# Patient Record
Sex: FEMALE | Race: WHITE | Employment: UNEMPLOYED | URBAN - METROPOLITAN AREA
[De-identification: names, ages, dates, MRNs, and addresses within clinical notes are randomized per-mention and may not be internally consistent; named-entity substitution may affect disease eponyms.]

---

## 2022-03-19 PROBLEM — R31.29 MICROHEMATURIA: Status: ACTIVE | Noted: 2018-12-13

## 2023-04-10 ENCOUNTER — OFFICE VISIT (OUTPATIENT)
Age: 53
End: 2023-04-10
Payer: OTHER GOVERNMENT

## 2023-04-10 VITALS
BODY MASS INDEX: 23.52 KG/M2 | WEIGHT: 127.8 LBS | RESPIRATION RATE: 16 BRPM | HEIGHT: 62 IN | SYSTOLIC BLOOD PRESSURE: 125 MMHG | HEART RATE: 92 BPM | OXYGEN SATURATION: 98 % | DIASTOLIC BLOOD PRESSURE: 85 MMHG

## 2023-04-10 DIAGNOSIS — K76.0 FATTY LIVER: ICD-10-CM

## 2023-04-10 DIAGNOSIS — R93.2 ABNORMAL LIVER ULTRASOUND: Primary | ICD-10-CM

## 2023-04-10 PROCEDURE — 3074F SYST BP LT 130 MM HG: CPT | Performed by: INTERNAL MEDICINE

## 2023-04-10 PROCEDURE — 91200 LIVER ELASTOGRAPHY: CPT | Performed by: INTERNAL MEDICINE

## 2023-04-10 PROCEDURE — 99204 OFFICE O/P NEW MOD 45 MIN: CPT | Performed by: INTERNAL MEDICINE

## 2023-04-10 PROCEDURE — 3078F DIAST BP <80 MM HG: CPT | Performed by: INTERNAL MEDICINE

## 2023-04-10 RX ORDER — ATORVASTATIN CALCIUM 10 MG/1
10 TABLET, FILM COATED ORAL DAILY
COMMUNITY

## 2023-04-10 RX ORDER — AMLODIPINE BESYLATE 5 MG/1
5 TABLET ORAL DAILY
COMMUNITY

## 2023-04-10 RX ORDER — SERTRALINE HYDROCHLORIDE 20 MG/ML
25 SOLUTION ORAL DAILY
COMMUNITY

## 2023-04-10 RX ORDER — URSODIOL 300 MG/1
300 CAPSULE ORAL
Qty: 180 CAPSULE | Refills: 3 | Status: SHIPPED | OUTPATIENT
Start: 2023-04-10 | End: 2024-04-09

## 2023-10-09 PROBLEM — N83.209 OVARIAN CYST: Status: ACTIVE | Noted: 2019-03-29

## 2023-10-09 PROBLEM — K92.2 GI BLEED: Status: ACTIVE | Noted: 2017-05-10

## 2023-10-09 PROBLEM — D64.9 ANEMIA: Status: ACTIVE | Noted: 2017-03-28

## 2023-10-09 PROBLEM — N92.0 MENORRHAGIA: Status: ACTIVE | Noted: 2023-10-09

## 2023-10-09 PROBLEM — O24.419 GESTATIONAL DIABETES: Status: ACTIVE | Noted: 2019-03-29

## 2023-10-09 PROBLEM — M54.16 LUMBAR RADICULOPATHY: Status: ACTIVE | Noted: 2021-09-15

## 2023-10-09 PROBLEM — K42.9 UMBILICAL HERNIA: Status: ACTIVE | Noted: 2019-03-29

## 2023-10-09 PROBLEM — R25.2 CRAMP OF BOTH LOWER EXTREMITIES: Status: ACTIVE | Noted: 2023-10-09

## 2023-10-09 PROBLEM — E78.5 DYSLIPIDEMIA: Status: ACTIVE | Noted: 2019-03-29

## 2023-10-09 PROBLEM — D25.9 UTERINE LEIOMYOMA: Status: ACTIVE | Noted: 2020-04-28

## 2023-10-09 PROBLEM — G43.909 HEADACHE, MIGRAINE: Status: ACTIVE | Noted: 2019-03-29

## 2023-10-09 PROBLEM — K21.9 GASTROESOPHAGEAL REFLUX DISEASE: Status: ACTIVE | Noted: 2023-10-09

## 2023-10-09 PROBLEM — M41.9 SCOLIOSIS: Status: ACTIVE | Noted: 2019-03-29

## 2023-10-09 PROBLEM — J45.20 MILD INTERMITTENT ASTHMA WITHOUT COMPLICATION: Status: ACTIVE | Noted: 2019-03-29

## 2023-10-09 PROBLEM — R42 DIZZY SPELLS: Status: ACTIVE | Noted: 2019-04-05

## 2023-10-09 PROBLEM — D50.9 IRON DEFICIENCY ANEMIA: Status: ACTIVE | Noted: 2023-10-09

## 2023-10-09 PROBLEM — K63.5 COLON POLYP: Status: ACTIVE | Noted: 2017-10-17

## 2023-10-09 PROBLEM — K80.20 CHOLELITHIASIS: Status: ACTIVE | Noted: 2019-03-29

## 2023-10-09 PROBLEM — E61.1 IRON DEFICIENCY: Status: ACTIVE | Noted: 2021-08-12

## 2023-10-09 PROBLEM — R73.01 IMPAIRED FASTING GLUCOSE: Status: ACTIVE | Noted: 2019-03-29

## 2023-10-09 PROBLEM — R29.898 WEAKNESS OF LEFT FOOT: Status: ACTIVE | Noted: 2021-10-20

## 2023-10-09 PROBLEM — R55 SYNCOPE AND COLLAPSE: Status: ACTIVE | Noted: 2019-03-29

## 2023-10-09 PROBLEM — F32.A DEPRESSION: Status: ACTIVE | Noted: 2019-03-29

## 2024-01-19 ENCOUNTER — TELEPHONE (OUTPATIENT)
Age: 54
End: 2024-01-19

## 2024-01-19 NOTE — TELEPHONE ENCOUNTER
1/19/2024    Juan Luis asking for call back to re-schedule.  Cancelled 1/22/2024 appt per message.    efs        ----- Message from Toyin Landaverde sent at 1/19/2024 12:35 PM EST -----  Regarding: Cancellation of appointment   Contact: [phone]  Hi. I need to change my appointment to a different day. I’m scheduled Monday Jan 22 at 3:30.  I didn’t realize you were closed on Fridays.  Please call me back so I can reschedule.  Thank you.  Toyin Landaverde

## 2024-02-28 ENCOUNTER — HOSPITAL ENCOUNTER (OUTPATIENT)
Facility: HOSPITAL | Age: 54
Discharge: HOME | End: 2024-03-02
Payer: OTHER GOVERNMENT

## 2024-02-28 ENCOUNTER — TRANSCRIBE ORDERS (OUTPATIENT)
Facility: HOSPITAL | Age: 54
End: 2024-02-28

## 2024-02-28 VITALS — HEIGHT: 62 IN | WEIGHT: 126 LBS | BODY MASS INDEX: 23.19 KG/M2

## 2024-02-28 DIAGNOSIS — D25.9 UTERINE LEIOMYOMA, UNSPECIFIED LOCATION: ICD-10-CM

## 2024-02-28 DIAGNOSIS — N39.3 FEMALE STRESS INCONTINENCE: ICD-10-CM

## 2024-02-28 DIAGNOSIS — R10.2 PELVIC PAIN SYNDROME: ICD-10-CM

## 2024-02-28 DIAGNOSIS — N84.0 POLYP OF CORPUS UTERI: ICD-10-CM

## 2024-02-28 DIAGNOSIS — N88.2 STRICTURE AND STENOSIS OF CERVIX: Primary | ICD-10-CM

## 2024-02-28 DIAGNOSIS — N88.2 STRICTURE AND STENOSIS OF CERVIX: ICD-10-CM

## 2024-02-28 LAB
ANION GAP SERPL CALC-SCNC: 4 MMOL/L (ref 3–18)
BASOPHILS # BLD: 0 K/UL (ref 0–0.1)
BASOPHILS NFR BLD: 0 % (ref 0–2)
BUN SERPL-MCNC: 15 MG/DL (ref 7–18)
BUN/CREAT SERPL: 23 (ref 12–20)
CALCIUM SERPL-MCNC: 9.1 MG/DL (ref 8.5–10.1)
CHLORIDE SERPL-SCNC: 103 MMOL/L (ref 100–111)
CO2 SERPL-SCNC: 30 MMOL/L (ref 21–32)
CREAT SERPL-MCNC: 0.66 MG/DL (ref 0.6–1.3)
DIFFERENTIAL METHOD BLD: ABNORMAL
EKG ATRIAL RATE: 87 BPM
EKG DIAGNOSIS: NORMAL
EKG P AXIS: 68 DEGREES
EKG P-R INTERVAL: 136 MS
EKG Q-T INTERVAL: 360 MS
EKG QRS DURATION: 86 MS
EKG QTC CALCULATION (BAZETT): 433 MS
EKG R AXIS: 41 DEGREES
EKG T AXIS: 74 DEGREES
EKG VENTRICULAR RATE: 87 BPM
EOSINOPHIL # BLD: 0.1 K/UL (ref 0–0.4)
EOSINOPHIL NFR BLD: 1 % (ref 0–5)
ERYTHROCYTE [DISTWIDTH] IN BLOOD BY AUTOMATED COUNT: 18.4 % (ref 11.6–14.5)
EST. AVERAGE GLUCOSE BLD GHB EST-MCNC: 100 MG/DL
GLUCOSE SERPL-MCNC: 92 MG/DL (ref 74–99)
HBA1C MFR BLD: 5.1 % (ref 4.2–5.6)
HCT VFR BLD AUTO: 33.5 % (ref 35–45)
HGB BLD-MCNC: 10.4 G/DL (ref 12–16)
IMM GRANULOCYTES # BLD AUTO: 0.1 K/UL (ref 0–0.04)
IMM GRANULOCYTES NFR BLD AUTO: 1 % (ref 0–0.5)
LYMPHOCYTES # BLD: 1.4 K/UL (ref 0.9–3.6)
LYMPHOCYTES NFR BLD: 16 % (ref 21–52)
MCH RBC QN AUTO: 25.4 PG (ref 24–34)
MCHC RBC AUTO-ENTMCNC: 31 G/DL (ref 31–37)
MCV RBC AUTO: 81.9 FL (ref 78–100)
MONOCYTES # BLD: 0.7 K/UL (ref 0.05–1.2)
MONOCYTES NFR BLD: 7 % (ref 3–10)
NEUTS SEG # BLD: 6.8 K/UL (ref 1.8–8)
NEUTS SEG NFR BLD: 75 % (ref 40–73)
NRBC # BLD: 0 K/UL (ref 0–0.01)
NRBC BLD-RTO: 0 PER 100 WBC
PLATELET # BLD AUTO: 286 K/UL (ref 135–420)
PMV BLD AUTO: 11.3 FL (ref 9.2–11.8)
POTASSIUM SERPL-SCNC: 4 MMOL/L (ref 3.5–5.5)
RBC # BLD AUTO: 4.09 M/UL (ref 4.2–5.3)
SODIUM SERPL-SCNC: 137 MMOL/L (ref 136–145)
WBC # BLD AUTO: 9.1 K/UL (ref 4.6–13.2)

## 2024-02-28 PROCEDURE — 83036 HEMOGLOBIN GLYCOSYLATED A1C: CPT

## 2024-02-28 PROCEDURE — 85025 COMPLETE CBC W/AUTO DIFF WBC: CPT

## 2024-02-28 PROCEDURE — 93005 ELECTROCARDIOGRAM TRACING: CPT

## 2024-02-28 PROCEDURE — 80048 BASIC METABOLIC PNL TOTAL CA: CPT

## 2024-03-01 NOTE — H&P
as needed N      02/20/2024 tranexamic acid 650 mg tablet take 2 tablet by oral route 3 times every day during menses N      07/19/2018 Zoloft 50 mg tablet TAKE 1 TABLET BY MOUTH ONCE A DAY. N          To Be Scheduled / Ordered  Status Order Reason Assessment Timeframe Appointment   ordered HCG In Office Urine  Z32.02         Orders/Procedures/Instructions/Educations  Labs  HCG In Office Urine       Service CPT Mod Dx 1 Dx 2 Dx 3 Dx 4   SYST BP < 130 MM HG 3074F  Z00.00      OFFICE/OUTPATIENT VISIT, EST 35823 25 D25.1 N88.2 D25.9    MED LIST DOCD IN RD 1159F  Z00.00      DIAST BP 80-89 MM HG 3079F  Z00.00      BIOPSY OF UTERUS LINING 46632 52 D25.9        ndc cpt4    1159F    3074F    3079F    43549    32777     Provider  Alex Huggins 02/27/2024 8:44 AM   Document generated by: Alex Huggins 02/27/2024 08:44 AM      ----------------------------------------------------------------------------------------------------------------------------------------------------------------------      Electronically signed by Alex Huggins DO on 02/27/2024 10:39 AM

## 2024-03-05 ENCOUNTER — ANESTHESIA EVENT (OUTPATIENT)
Facility: HOSPITAL | Age: 54
End: 2024-03-05
Payer: OTHER GOVERNMENT

## 2024-03-11 ENCOUNTER — HOSPITAL ENCOUNTER (OUTPATIENT)
Facility: HOSPITAL | Age: 54
Setting detail: OUTPATIENT SURGERY
Discharge: HOME | End: 2024-03-11
Attending: OBSTETRICS & GYNECOLOGY | Admitting: OBSTETRICS & GYNECOLOGY
Payer: OTHER GOVERNMENT

## 2024-03-11 ENCOUNTER — ANESTHESIA (OUTPATIENT)
Facility: HOSPITAL | Age: 54
End: 2024-03-11
Payer: OTHER GOVERNMENT

## 2024-03-11 VITALS
BODY MASS INDEX: 23.55 KG/M2 | WEIGHT: 128 LBS | HEIGHT: 62 IN | SYSTOLIC BLOOD PRESSURE: 126 MMHG | RESPIRATION RATE: 16 BRPM | DIASTOLIC BLOOD PRESSURE: 77 MMHG | OXYGEN SATURATION: 100 % | HEART RATE: 73 BPM | TEMPERATURE: 97.2 F

## 2024-03-11 LAB
GLUCOSE BLD STRIP.AUTO-MCNC: 94 MG/DL (ref 70–110)
HCG UR QL: NEGATIVE

## 2024-03-11 PROCEDURE — 7100000010 HC PHASE II RECOVERY - FIRST 15 MIN: Performed by: OBSTETRICS & GYNECOLOGY

## 2024-03-11 PROCEDURE — 7100000001 HC PACU RECOVERY - ADDTL 15 MIN: Performed by: OBSTETRICS & GYNECOLOGY

## 2024-03-11 PROCEDURE — 6360000002 HC RX W HCPCS: Performed by: NURSE ANESTHETIST, CERTIFIED REGISTERED

## 2024-03-11 PROCEDURE — 3600000012 HC SURGERY LEVEL 2 ADDTL 15MIN: Performed by: OBSTETRICS & GYNECOLOGY

## 2024-03-11 PROCEDURE — 2709999900 HC NON-CHARGEABLE SUPPLY: Performed by: OBSTETRICS & GYNECOLOGY

## 2024-03-11 PROCEDURE — 3600000002 HC SURGERY LEVEL 2 BASE: Performed by: OBSTETRICS & GYNECOLOGY

## 2024-03-11 PROCEDURE — 7100000000 HC PACU RECOVERY - FIRST 15 MIN: Performed by: OBSTETRICS & GYNECOLOGY

## 2024-03-11 PROCEDURE — 7100000011 HC PHASE II RECOVERY - ADDTL 15 MIN: Performed by: OBSTETRICS & GYNECOLOGY

## 2024-03-11 PROCEDURE — 3700000000 HC ANESTHESIA ATTENDED CARE: Performed by: OBSTETRICS & GYNECOLOGY

## 2024-03-11 PROCEDURE — 82962 GLUCOSE BLOOD TEST: CPT

## 2024-03-11 PROCEDURE — 3700000001 HC ADD 15 MINUTES (ANESTHESIA): Performed by: OBSTETRICS & GYNECOLOGY

## 2024-03-11 PROCEDURE — 2500000003 HC RX 250 WO HCPCS: Performed by: NURSE ANESTHETIST, CERTIFIED REGISTERED

## 2024-03-11 PROCEDURE — A4217 STERILE WATER/SALINE, 500 ML: HCPCS | Performed by: OBSTETRICS & GYNECOLOGY

## 2024-03-11 PROCEDURE — 88305 TISSUE EXAM BY PATHOLOGIST: CPT

## 2024-03-11 PROCEDURE — 81025 URINE PREGNANCY TEST: CPT

## 2024-03-11 PROCEDURE — 2580000003 HC RX 258: Performed by: OBSTETRICS & GYNECOLOGY

## 2024-03-11 RX ORDER — HYDROMORPHONE HYDROCHLORIDE 1 MG/ML
0.5 INJECTION, SOLUTION INTRAMUSCULAR; INTRAVENOUS; SUBCUTANEOUS EVERY 5 MIN PRN
Status: DISCONTINUED | OUTPATIENT
Start: 2024-03-11 | End: 2024-03-11 | Stop reason: HOSPADM

## 2024-03-11 RX ORDER — MEPERIDINE HYDROCHLORIDE 50 MG/ML
12.5 INJECTION INTRAMUSCULAR; INTRAVENOUS; SUBCUTANEOUS ONCE
Status: DISCONTINUED | OUTPATIENT
Start: 2024-03-11 | End: 2024-03-11 | Stop reason: HOSPADM

## 2024-03-11 RX ORDER — ONDANSETRON 2 MG/ML
INJECTION INTRAMUSCULAR; INTRAVENOUS PRN
Status: DISCONTINUED | OUTPATIENT
Start: 2024-03-11 | End: 2024-03-11 | Stop reason: SDUPTHER

## 2024-03-11 RX ORDER — PROPOFOL 10 MG/ML
INJECTION, EMULSION INTRAVENOUS PRN
Status: DISCONTINUED | OUTPATIENT
Start: 2024-03-11 | End: 2024-03-11 | Stop reason: SDUPTHER

## 2024-03-11 RX ORDER — FENTANYL CITRATE 50 UG/ML
INJECTION, SOLUTION INTRAMUSCULAR; INTRAVENOUS PRN
Status: DISCONTINUED | OUTPATIENT
Start: 2024-03-11 | End: 2024-03-11 | Stop reason: SDUPTHER

## 2024-03-11 RX ORDER — SODIUM CHLORIDE, SODIUM LACTATE, POTASSIUM CHLORIDE, CALCIUM CHLORIDE 600; 310; 30; 20 MG/100ML; MG/100ML; MG/100ML; MG/100ML
INJECTION, SOLUTION INTRAVENOUS CONTINUOUS
Status: DISCONTINUED | OUTPATIENT
Start: 2024-03-11 | End: 2024-03-11 | Stop reason: HOSPADM

## 2024-03-11 RX ORDER — DIPHENHYDRAMINE HYDROCHLORIDE 50 MG/ML
12.5 INJECTION INTRAMUSCULAR; INTRAVENOUS
Status: DISCONTINUED | OUTPATIENT
Start: 2024-03-11 | End: 2024-03-11 | Stop reason: HOSPADM

## 2024-03-11 RX ORDER — DROPERIDOL 2.5 MG/ML
0.62 INJECTION, SOLUTION INTRAMUSCULAR; INTRAVENOUS
Status: DISCONTINUED | OUTPATIENT
Start: 2024-03-11 | End: 2024-03-11 | Stop reason: HOSPADM

## 2024-03-11 RX ORDER — MIDAZOLAM HYDROCHLORIDE 1 MG/ML
INJECTION INTRAMUSCULAR; INTRAVENOUS PRN
Status: DISCONTINUED | OUTPATIENT
Start: 2024-03-11 | End: 2024-03-11 | Stop reason: SDUPTHER

## 2024-03-11 RX ORDER — IPRATROPIUM BROMIDE AND ALBUTEROL SULFATE 2.5; .5 MG/3ML; MG/3ML
1 SOLUTION RESPIRATORY (INHALATION)
Status: DISCONTINUED | OUTPATIENT
Start: 2024-03-11 | End: 2024-03-11 | Stop reason: HOSPADM

## 2024-03-11 RX ORDER — NALOXONE HYDROCHLORIDE 0.4 MG/ML
INJECTION, SOLUTION INTRAMUSCULAR; INTRAVENOUS; SUBCUTANEOUS PRN
Status: DISCONTINUED | OUTPATIENT
Start: 2024-03-11 | End: 2024-03-11 | Stop reason: HOSPADM

## 2024-03-11 RX ORDER — SODIUM CHLORIDE 9 MG/ML
INJECTION, SOLUTION INTRAVENOUS PRN
Status: DISCONTINUED | OUTPATIENT
Start: 2024-03-11 | End: 2024-03-11 | Stop reason: HOSPADM

## 2024-03-11 RX ORDER — LABETALOL HYDROCHLORIDE 5 MG/ML
10 INJECTION, SOLUTION INTRAVENOUS
Status: DISCONTINUED | OUTPATIENT
Start: 2024-03-11 | End: 2024-03-11 | Stop reason: HOSPADM

## 2024-03-11 RX ORDER — SODIUM CHLORIDE 0.9 % (FLUSH) 0.9 %
5-40 SYRINGE (ML) INJECTION EVERY 12 HOURS SCHEDULED
Status: DISCONTINUED | OUTPATIENT
Start: 2024-03-11 | End: 2024-03-11 | Stop reason: HOSPADM

## 2024-03-11 RX ORDER — OXYCODONE HYDROCHLORIDE 5 MG/1
5 TABLET ORAL
Status: DISCONTINUED | OUTPATIENT
Start: 2024-03-11 | End: 2024-03-11 | Stop reason: HOSPADM

## 2024-03-11 RX ORDER — SODIUM CHLORIDE 0.9 % (FLUSH) 0.9 %
5-40 SYRINGE (ML) INJECTION PRN
Status: DISCONTINUED | OUTPATIENT
Start: 2024-03-11 | End: 2024-03-11 | Stop reason: HOSPADM

## 2024-03-11 RX ORDER — DEXAMETHASONE SODIUM PHOSPHATE 4 MG/ML
INJECTION, SOLUTION INTRA-ARTICULAR; INTRALESIONAL; INTRAMUSCULAR; INTRAVENOUS; SOFT TISSUE PRN
Status: DISCONTINUED | OUTPATIENT
Start: 2024-03-11 | End: 2024-03-11 | Stop reason: SDUPTHER

## 2024-03-11 RX ORDER — LIDOCAINE HYDROCHLORIDE 20 MG/ML
INJECTION, SOLUTION EPIDURAL; INFILTRATION; INTRACAUDAL; PERINEURAL PRN
Status: DISCONTINUED | OUTPATIENT
Start: 2024-03-11 | End: 2024-03-11 | Stop reason: SDUPTHER

## 2024-03-11 RX ORDER — ONDANSETRON 2 MG/ML
4 INJECTION INTRAMUSCULAR; INTRAVENOUS
Status: DISCONTINUED | OUTPATIENT
Start: 2024-03-11 | End: 2024-03-11 | Stop reason: HOSPADM

## 2024-03-11 RX ORDER — FENTANYL CITRATE 50 UG/ML
25 INJECTION, SOLUTION INTRAMUSCULAR; INTRAVENOUS EVERY 5 MIN PRN
Status: DISCONTINUED | OUTPATIENT
Start: 2024-03-11 | End: 2024-03-11 | Stop reason: HOSPADM

## 2024-03-11 RX ORDER — MAGNESIUM HYDROXIDE 1200 MG/15ML
LIQUID ORAL CONTINUOUS PRN
Status: COMPLETED | OUTPATIENT
Start: 2024-03-11 | End: 2024-03-11

## 2024-03-11 RX ADMIN — FENTANYL CITRATE 50 MCG: 50 INJECTION, SOLUTION INTRAMUSCULAR; INTRAVENOUS at 11:10

## 2024-03-11 RX ADMIN — LIDOCAINE HYDROCHLORIDE 80 MG: 20 INJECTION, SOLUTION EPIDURAL; INFILTRATION; INTRACAUDAL; PERINEURAL at 11:13

## 2024-03-11 RX ADMIN — PROPOFOL 150 MG: 10 INJECTION, EMULSION INTRAVENOUS at 11:13

## 2024-03-11 RX ADMIN — SODIUM CHLORIDE, POTASSIUM CHLORIDE, SODIUM LACTATE AND CALCIUM CHLORIDE: 600; 310; 30; 20 INJECTION, SOLUTION INTRAVENOUS at 10:15

## 2024-03-11 RX ADMIN — DEXAMETHASONE SODIUM PHOSPHATE 4 MG: 4 INJECTION, SOLUTION INTRAMUSCULAR; INTRAVENOUS at 11:18

## 2024-03-11 RX ADMIN — ONDANSETRON HYDROCHLORIDE 4 MG: 2 INJECTION INTRAMUSCULAR; INTRAVENOUS at 11:18

## 2024-03-11 RX ADMIN — FENTANYL CITRATE 50 MCG: 50 INJECTION, SOLUTION INTRAMUSCULAR; INTRAVENOUS at 11:13

## 2024-03-11 RX ADMIN — MIDAZOLAM 2 MG: 1 INJECTION INTRAMUSCULAR; INTRAVENOUS at 11:07

## 2024-03-11 ASSESSMENT — PAIN - FUNCTIONAL ASSESSMENT: PAIN_FUNCTIONAL_ASSESSMENT: 0-10

## 2024-03-11 ASSESSMENT — PAIN SCALES - GENERAL
PAINLEVEL_OUTOF10: 0

## 2024-03-11 NOTE — PERIOP NOTE
TRANSFER - OUT REPORT:    Verbal report given to Aure ESPINOZA RN on Toyin Landaverde  being transferred to phase 2 for routine post-op       Report consisted of patient's Situation, Background, Assessment and   Recommendations(SBAR).     Information from the following report(s) Nurse Handoff Report, Adult Overview, Surgery Report, Intake/Output, MAR, Recent Results, and Med Rec Status was reviewed with the receiving nurse.           Lines:   Peripheral IV 03/11/24 Left;Posterior Hand (Active)   Site Assessment Clean, dry & intact 03/11/24 1227   Line Status Infusing 03/11/24 1227   Line Care Connections checked and tightened 03/11/24 1227   Phlebitis Assessment No symptoms 03/11/24 1227   Infiltration Assessment 0 03/11/24 1227   Dressing Status Clean, dry & intact 03/11/24 1227   Dressing Type Transparent 03/11/24 1227   Dressing Intervention New 03/11/24 1102        Opportunity for questions and clarification was provided.      Patient transported with:  Registered Nurse

## 2024-03-11 NOTE — PERIOP NOTE
TRANSFER - IN REPORT:    Verbal report received from NANCY Dietz RN on Toyin Landaverde  being received from PACU for routine post-op      Report consisted of patient's Situation, Background, Assessment and   Recommendations(SBAR).     Information from the following report(s) Nurse Handoff Report, Adult Overview, Surgery Report, Intake/Output, and MAR was reviewed with the receiving nurse.    Opportunity for questions and clarification was provided.      Assessment completed upon patient's arrival to unit and care assumed.

## 2024-03-11 NOTE — DISCHARGE INSTRUCTIONS
Resume pre hospital diet  Drink plenty of fluids  Ambulate in house  Sanitary pads only - no tampons, douching or sex until advised  Shower tomorrow  Follow up with Dr. Huggins as scheduled     Hysteroscopy With Dilation and Curettage: What to Expect at Home  Your Recovery     For a hysteroscopy, your doctor guides a lighted tube through your cervix and into your uterus. This helps the doctor see inside your uterus.  For a dilation and curettage (D&C), your doctor uses a curved tool, called a curette, to gently scrape tissue from your uterus.  After these procedures, you are likely to have a backache or cramps similar to menstrual cramps. Expect to pass small clots of blood from your vagina for the first few days. You may have light vaginal bleeding for several weeks after the D&C.  If the doctor filled your uterus with air, your belly may feel full. You may also have shoulder pain right after the procedure.  You will probably be able to go back to most of your normal activities in 1 or 2 days.  This care sheet gives you a general idea about how long it will take for you to recover. But each person recovers at a different pace. Follow the steps below to get better as quickly as possible.  How can you care for yourself at home?  Activity    Rest when you feel tired.     You will probably be able to return to work the day after the procedure. But it depends on what was done and the type of work you do.     You may have some light vaginal bleeding. Use sanitary pads until you stop bleeding. Using pads makes it easier to monitor your bleeding. Do not rinse your vagina with fluid (douche).     Ask your doctor when it is okay for you to have sex.   Diet    You can eat your normal diet. If your stomach is upset, try bland, low-fat foods like plain rice, broiled chicken, toast, and yogurt.     Drink plenty of fluids (unless your doctor tells you not to).   Medicines    Your doctor will tell you if and when you can restart

## 2024-03-11 NOTE — OP NOTE
Operative Note      Patient: Toyin Landaverde  YOB: 1970  MRN: [id number]    Date of Procedure: 3/11/2024    Pre-Op Diagnosis Codes:     * Uterine leiomyoma, unspecified location [D25.9], Postmenopausal bleeding    Post-Op Diagnosis: Same       Procedure(s):  HYSTEROSCOPY, DILATATION AND CURETTAGE \"SPEC POP\"    Surgeon(s):  Agustina Huggins MD    Assistant:   * No surgical staff found *    Anesthesia: General    Estimated Blood Loss (mL): Minimal    Complications: None    Specimens:   ID Type Source Tests Collected by Time Destination   A : ENDOMETRIAL CURETTINGS Tissue Uterus SURGICAL PATHOLOGY Agustina Huggins MD 3/11/2024 1122        Implants:  * No implants in log *      Drains: * No LDAs found *    Findings: enlarged uterus, grossly normal endometrium        Detailed Description of Procedure:     The patient was taken to the operating room where she was placed in the supine position. After being placed under general anesthesia, she was placed up in the Bebeto laparoscopy stirrups in the dorsal lithotomy position.The patient was then prepped and draped in the usual fashion and the Yoder catheter was placed into the bladder. Attention was first turned to the vagina where the speculum was placed in the vagina. The anterior lip of the cervix was grasped with a single-toothed tenaculum. The cervix was dilated to admit a diagnostic hysteroscope using normal saline for distention media. The above findings were noted. Curettage was performed and specimen was sent to pathology.  All instruments were removed from the vagina. The patient was awakened, extubated and taken to the recovery room in good condition.    Electronically signed by AGUSTINA HUGGINS MD on 3/11/2024 at 11:51 AM

## 2024-03-11 NOTE — PERIOP NOTE
Reviewed PTA medication list with patient/caregiver and patient/caregiver denies any additional medications.     Patient admits to having a responsible adult care for them at home for at least 24 hours after surgery.    Patient encouraged to use gown warming system and informed that using said warming gown to regulate body temperature prior to a procedure has been shown to help reduce the risks of blood clots and infection.    Patient's pharmacy of choice verified and documented in PTA medication section.    Dual skin assessment & fall risk band verification completed with Tanner NEVAREZ RN.

## 2024-03-11 NOTE — ANESTHESIA POSTPROCEDURE EVALUATION
Post-Anesthesia Evaluation and Assessment    Cardiovascular Function/Vital Signs/Pain Score:  Vitals  BP: 125/86  Temp: 98.1 °F (36.7 °C)  Temp Source: Axillary  Pulse: 82  Respirations: 19  SpO2: 100 %  Height: 157.5 cm (5' 2\")  Weight - Scale: 58.1 kg (128 lb)  Pain Level: 0    Patient is status post Procedure(s):  HYSTEROSCOPY, DILATATION AND CURETTAGE \"SPEC POP\".    Nausea/Vomiting: Controlled.    Postoperative hydration reviewed and adequate.    Pain:  Managed    Mental Status and Level of Consciousness: Baseline and appropriate for discharge.    Adequate oxygenation and airway patent.    Complications related to anesthesia: None    Post-anesthesia assessment completed. No concerns.    Patient has met all discharge requirements.    Signed By: Bob Fernández MD    March 11, 2024

## 2024-03-11 NOTE — ANESTHESIA PRE PROCEDURE
Department of Anesthesiology  Preprocedure Note       Name:  Toyin Landaverde   Age:  53 y.o.  :  1970                                          MRN:  [id number]         Date:  3/11/2024      Surgeon: Surgeon(s):  Alex Huggins MD    Procedure: Procedure(s):  HYSTEROSCOPY, DILATATION AND CURETTAGE \"SPEC POP\"    Medications prior to admission:   Prior to Admission medications    Medication Sig Start Date End Date Taking? Authorizing Provider   ferrous sulfate (IRON 325) 325 (65 Fe) MG tablet Take 88 mg by mouth daily (with breakfast)    Bertin Guzman MD   AMLODIPINE-ATORVASTATIN PO Take 5 mg by mouth  Patient not taking: Reported on 3/11/2024    Bertin Guzman MD   pantoprazole (PROTONIX) 20 MG tablet Take 1 tablet by mouth    Bertin Guzman MD   TURMERIC CURCUMIN PO Take 1 capsule by mouth daily    Bertin Guzman MD   Multiple Vitamin (MULTIVITAMIN ADULT PO) Take 1 tablet by mouth daily  Patient not taking: Reported on 3/1/2024    Bertin Guzman MD   OMEGA-3 FATTY ACIDS-VITAMIN E PO Take 1 capsule by mouth    Bertin Guzman MD   Vitamins C E (VITAMIN C/VITAMIN E PO) Take by mouth    Bertin Guzman MD   albuterol sulfate HFA (PROVENTIL;VENTOLIN;PROAIR) 108 (90 Base) MCG/ACT inhaler Inhale 2 puffs into the lungs every 4 hours as needed for Shortness of Breath or Wheezing 23   Bertin Guzman MD   vitamin D (CHOLECALCIFEROL) 25 MCG (1000 UT) TABS tablet Take 5 tablets by mouth    Bertin Guzman MD   cyclobenzaprine (FLEXERIL) 10 MG tablet Take 1 tablet by mouth 3 times daily 21   Bertin Guzman MD   dicyclomine (BENTYL) 20 MG tablet  9/10/22   Bertin Guzman MD   blood glucose test strips (FREESTYLE LITE) strip  21   Bertin Guzman MD   LORazepam (ATIVAN) 0.5 MG tablet Take 1 tablet by mouth 2 times daily as needed for Anxiety. 23   Bertin Guzman MD   montelukast (SINGULAIR) 10 MG tablet Take 1 tablet

## 2024-03-11 NOTE — PERIOP NOTE
TRANSFER - IN REPORT:    Verbal report received from OR nurse and CRNA on Toyin Landaverde  being received from OR for routine post-op      Report consisted of patient's Situation, Background, Assessment and   Recommendations(SBAR).     Information from the following report(s) Nurse Handoff Report, Adult Overview, Surgery Report, Intake/Output, MAR, Recent Results, and Med Rec Status was reviewed with the receiving nurse.    Opportunity for questions and clarification was provided.      Assessment completed upon patient's arrival to unit and care assumed.

## 2024-03-11 NOTE — INTERVAL H&P NOTE
Update History & Physical    The patient's History and Physical was reviewed with the patient and I examined the patient. There was no change. The surgical site was confirmed by the patient and me.       Plan: The risks, benefits, expected outcome, and alternative to the recommended procedure have been discussed with the patient. Patient understands and wants to proceed with the procedure.

## 2024-04-15 ENCOUNTER — OFFICE VISIT (OUTPATIENT)
Age: 54
End: 2024-04-15
Payer: OTHER GOVERNMENT

## 2024-04-15 VITALS
OXYGEN SATURATION: 98 % | HEIGHT: 62 IN | WEIGHT: 127.2 LBS | HEART RATE: 85 BPM | SYSTOLIC BLOOD PRESSURE: 124 MMHG | DIASTOLIC BLOOD PRESSURE: 85 MMHG | BODY MASS INDEX: 23.41 KG/M2 | TEMPERATURE: 98.1 F

## 2024-04-15 DIAGNOSIS — R93.2 ABNORMAL LIVER ULTRASOUND: Primary | ICD-10-CM

## 2024-04-15 PROBLEM — E61.1 IRON DEFICIENCY: Status: RESOLVED | Noted: 2021-08-12 | Resolved: 2024-04-15

## 2024-04-15 PROBLEM — E78.5 DYSLIPIDEMIA: Status: RESOLVED | Noted: 2019-03-29 | Resolved: 2024-04-15

## 2024-04-15 PROBLEM — D64.9 ANEMIA: Status: RESOLVED | Noted: 2017-03-28 | Resolved: 2024-04-15

## 2024-04-15 PROBLEM — R29.898 WEAKNESS OF LEFT FOOT: Status: RESOLVED | Noted: 2021-10-20 | Resolved: 2024-04-15

## 2024-04-15 PROBLEM — R25.2 CRAMP OF BOTH LOWER EXTREMITIES: Status: RESOLVED | Noted: 2023-10-09 | Resolved: 2024-04-15

## 2024-04-15 PROBLEM — R42 DIZZY SPELLS: Status: RESOLVED | Noted: 2019-04-05 | Resolved: 2024-04-15

## 2024-04-15 PROBLEM — F32.A DEPRESSION: Status: RESOLVED | Noted: 2019-03-29 | Resolved: 2024-04-15

## 2024-04-15 PROBLEM — J45.20 MILD INTERMITTENT ASTHMA WITHOUT COMPLICATION: Status: RESOLVED | Noted: 2019-03-29 | Resolved: 2024-04-15

## 2024-04-15 PROBLEM — R55 SYNCOPE AND COLLAPSE: Status: RESOLVED | Noted: 2019-03-29 | Resolved: 2024-04-15

## 2024-04-15 PROBLEM — R31.29 MICROHEMATURIA: Status: RESOLVED | Noted: 2018-12-13 | Resolved: 2024-04-15

## 2024-04-15 PROBLEM — K63.5 COLON POLYP: Status: RESOLVED | Noted: 2017-10-17 | Resolved: 2024-04-15

## 2024-04-15 PROBLEM — K92.2 GI BLEED: Status: RESOLVED | Noted: 2017-05-10 | Resolved: 2024-04-15

## 2024-04-15 PROBLEM — G43.909 HEADACHE, MIGRAINE: Status: RESOLVED | Noted: 2019-03-29 | Resolved: 2024-04-15

## 2024-04-15 PROBLEM — O24.419 GESTATIONAL DIABETES: Status: RESOLVED | Noted: 2019-03-29 | Resolved: 2024-04-15

## 2024-04-15 PROCEDURE — 91200 LIVER ELASTOGRAPHY: CPT | Performed by: INTERNAL MEDICINE

## 2024-04-15 NOTE — PROGRESS NOTES
[facility]      Tommy Son MD, FACP, FACG, FAASLD      KATHARINA Zapata-SILVINO Bliss, [facility]-BC   Salma Multani, [facility]   Chelsea Durant, FNP-C  Nate Mackenzie, FNP-C   Kandi Pollack, [facility]   at [facility]   [address], [address]   [place]  [address] [phone]   FAX: [phone]  [facility]   [address], [address]   [place]  [address] [phone]   FAX: [phone]       Patient Care Team:  Lloyd Lockhart MD as PCP - General (Family Medicine)  Lloyd Lockhart MD (Family Medicine)      Patient Active Problem List   Diagnosis    Hypertension    Asthma    Adenomatous polyp of colon    Anemia    Cholelithiasis    Gastroesophageal reflux disease    Impaired fasting glucose    Iron deficiency anemia    Lumbar radiculopathy    Menorrhagia    Ovarian cyst    Scoliosis    Umbilical hernia    Uterine leiomyoma       Toyin Landaverde is being seen at [facility] for management of Steatotic Liver Disease (SLD) formerly called fatty liver.    The active problem list, all pertinent past medical history, medications, radiologic findings and laboratory findings related to the liver disorder were reviewed and discussed with the patient.      The patient is a 53 y.o. female who is suspected to have fatty liver disease based upon a CT and ultrasound.    Lliver transaminases are normal, alkaline phosphatase is normal, tests of hepatic synthetic and metabolic function are normal, and the platelet count is normal.      Serologic evaluation for markers of chronic liver disease was negative.    The most recent imaging of the liver was Ultrasound performed in 11/2022.  Results suggest fatty liver disease.      Fibroscan in 4/2023 was 3.7 kPa which correlates with no

## (undated) DEVICE — SOLUTION IV LACTATED RINGERS INJECTION USP

## (undated) DEVICE — GLOVE ORANGE PI 8 1/2   MSG9085

## (undated) DEVICE — D&C HYSTEROSCOPY: Brand: MEDLINE INDUSTRIES, INC.

## (undated) DEVICE — GARMENT,MEDLINE,DVT,INT,CALF,MED, GEN2: Brand: MEDLINE

## (undated) DEVICE — 4-PORT MANIFOLD: Brand: NEPTUNE 2